# Patient Record
Sex: FEMALE | Race: WHITE | NOT HISPANIC OR LATINO | URBAN - METROPOLITAN AREA
[De-identification: names, ages, dates, MRNs, and addresses within clinical notes are randomized per-mention and may not be internally consistent; named-entity substitution may affect disease eponyms.]

---

## 2023-09-07 ENCOUNTER — APPOINTMENT (RX ONLY)
Dept: URBAN - METROPOLITAN AREA OTHER 8 | Facility: OTHER | Age: 63
Setting detail: DERMATOLOGY
End: 2023-09-07

## 2023-09-07 DIAGNOSIS — G90.09 OTHER IDIOPATHIC PERIPHERAL AUTONOMIC NEUROPATHY: ICD-10-CM

## 2023-09-07 DIAGNOSIS — M79.6 PAIN IN LIMB, HAND, FOOT, FINGERS AND TOES: ICD-10-CM

## 2023-09-07 PROBLEM — M79.609 PAIN IN UNSPECIFIED LIMB: Status: ACTIVE | Noted: 2023-09-07

## 2023-09-07 PROCEDURE — ? DOPPLER US

## 2023-09-07 PROCEDURE — ? ADDITIONAL NOTES

## 2023-09-07 PROCEDURE — 93970 EXTREMITY STUDY: CPT

## 2023-09-07 PROCEDURE — 99203 OFFICE O/P NEW LOW 30 MIN: CPT

## 2023-09-07 NOTE — PROCEDURE: ADDITIONAL NOTES
Render Risk Assessment In Note?: no
Detail Level: Simple
Additional Notes: Same recommendations as listed above for neuropathy.

## 2023-09-07 NOTE — PROCEDURE: DOPPLER US
See Attached Documentation Text: Please refer to the attached ultrasound documentation for complete details of the procedure and the venous findings.
Other Right Leg Doppler Findings: No superficial or deep venous reflux seen.  +SQ stranding at ankle.  Good triphasic signal with normal amplitude and waveform in Posterior Tibialis artery.  Biphasic waveform with good amplitude and velocity seen in the dorsalis pedis artery.
Right Proximal Gsv Reflux (Sec): no reflux
Use - 'see Attached Documentation' Verbiage?: No
Detail Level: Simple
Ultrasound Used (Optional): Terason 3000
Use Ssv Or Lsv: SSV